# Patient Record
Sex: MALE | Race: BLACK OR AFRICAN AMERICAN
[De-identification: names, ages, dates, MRNs, and addresses within clinical notes are randomized per-mention and may not be internally consistent; named-entity substitution may affect disease eponyms.]

---

## 2020-03-14 ENCOUNTER — HOSPITAL ENCOUNTER (EMERGENCY)
Dept: HOSPITAL 62 - ER | Age: 25
Discharge: HOME | End: 2020-03-14
Payer: MEDICAID

## 2020-03-14 VITALS — SYSTOLIC BLOOD PRESSURE: 150 MMHG | DIASTOLIC BLOOD PRESSURE: 76 MMHG

## 2020-03-14 DIAGNOSIS — F17.200: ICD-10-CM
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
## 2020-03-14 DIAGNOSIS — S96.911A: Primary | ICD-10-CM
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
## 2020-03-14 DIAGNOSIS — J45.909: ICD-10-CM

## 2020-03-14 PROCEDURE — 99283 EMERGENCY DEPT VISIT LOW MDM: CPT
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
## 2020-03-14 NOTE — RADIOLOGY REPORT (SQ)
EXAM DESCRIPTION: 



XR ANKLE 3 OR MORE VIEWS



COMPLETED DATE/TME:  03/14/2020 20:51



CLINICAL HISTORY: 



24 years, Male, rolled ankle 



COMPARISON:

None.



NUMBER OF VIEWS:

3



TECHNIQUE:

Frontal, oblique, and lateral radiographs were obtained



LIMITATIONS:

None.



FINDINGS:



Visualized osseous structures are normal in appearance. Joint

spaces are well-maintained. No acute fracture or dislocation is

evident. However, there is soft tissue swelling about the lateral

aspect of the ankle.



IMPRESSION:



Soft tissue swelling about the lateral aspect of the ankle

without underlying acute osseous anomaly.

 



copyright 2011 [employer]- All Rights Reserved